# Patient Record
Sex: MALE | Race: WHITE | Employment: FULL TIME | URBAN - METROPOLITAN AREA
[De-identification: names, ages, dates, MRNs, and addresses within clinical notes are randomized per-mention and may not be internally consistent; named-entity substitution may affect disease eponyms.]

---
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
## 2022-03-17 DIAGNOSIS — G37.9 CLINICALLY ISOLATED SYNDROME (H): Primary | ICD-10-CM

## 2022-07-03 ENCOUNTER — TELEPHONE (OUTPATIENT)
Dept: NEUROLOGY | Facility: CLINIC | Age: 22
End: 2022-07-03

## 2022-07-03 NOTE — TELEPHONE ENCOUNTER
"Received a page from Sky with message that read \"MS symptoms.\" Attempted to call patient back at number provided and number in his chart multiple times. There was no response. I was able to leave a VM and instructed patient to call back and if he felt that he was in danger or required immediate medical attention to seek the nearest ER for evaluation.     Yuridia Marie DO   of Neurology   [facility]     "

## 2022-07-07 ENCOUNTER — ANCILLARY PROCEDURE (OUTPATIENT)
Dept: MRI IMAGING | Facility: CLINIC | Age: 22
End: 2022-07-07
Attending: PSYCHIATRY & NEUROLOGY
Payer: COMMERCIAL

## 2022-07-07 ENCOUNTER — TELEPHONE (OUTPATIENT)
Dept: NEUROLOGY | Facility: CLINIC | Age: 22
End: 2022-07-07

## 2022-07-07 DIAGNOSIS — G37.9 CLINICALLY ISOLATED SYNDROME (H): ICD-10-CM

## 2022-07-07 PROCEDURE — 70553 MRI BRAIN STEM W/O & W/DYE: CPT | Mod: GC | Performed by: RADIOLOGY

## 2022-07-07 PROCEDURE — A9585 GADOBUTROL INJECTION: HCPCS | Performed by: RADIOLOGY

## 2022-07-07 RX ORDER — GADOBUTROL 604.72 MG/ML
10 INJECTION INTRAVENOUS ONCE
Status: COMPLETED | OUTPATIENT
Start: 2022-07-07 | End: 2022-07-07

## 2022-07-07 RX ADMIN — GADOBUTROL 8 ML: 604.72 INJECTION INTRAVENOUS at 15:23

## 2022-07-07 NOTE — TELEPHONE ENCOUNTER
Patient called stating for the past few days he has had spreading numbness.  States initially it was in his heels only but now has risen to the hips and even up to the chest.  Denies weakness or any walking difficulty.      I instructed him to reach out to his neurologist tomorrow. Given his history I suspect he may need spinal MRI's but will defer decision on this and  steroids to his neuro-immunology provider.  I advised if symptoms are progressing and especially if develops weakness, falls, bladder dysfunction that he should present to ED more urgently for imaging.  Will route to his neurology team.      Sarah Rouse, DO

## 2022-07-13 ENCOUNTER — HOSPITAL ENCOUNTER (OUTPATIENT)
Dept: MRI IMAGING | Facility: CLINIC | Age: 22
Discharge: HOME | End: 2022-07-13
Attending: PSYCHIATRY & NEUROLOGY
Payer: COMMERCIAL

## 2022-07-13 DIAGNOSIS — G37.9 CLINICALLY ISOLATED SYNDROME (H): ICD-10-CM

## 2022-07-13 PROCEDURE — 72156 MRI NECK SPINE W/O & W/DYE: CPT

## 2022-07-13 PROCEDURE — A9585 GADOBUTROL INJECTION: HCPCS | Performed by: PSYCHIATRY & NEUROLOGY

## 2022-07-13 PROCEDURE — 255N000002 HC RX 255 OP 636: Performed by: PSYCHIATRY & NEUROLOGY

## 2022-07-13 PROCEDURE — 72157 MRI CHEST SPINE W/O & W/DYE: CPT

## 2022-07-13 RX ORDER — GADOBUTROL 604.72 MG/ML
8 INJECTION INTRAVENOUS ONCE
Status: COMPLETED | OUTPATIENT
Start: 2022-07-13 | End: 2022-07-13

## 2022-07-13 RX ADMIN — GADOBUTROL 8 ML: 604.72 INJECTION INTRAVENOUS at 17:22

## 2022-07-22 ENCOUNTER — TELEPHONE (OUTPATIENT)
Dept: NEUROLOGY | Facility: CLINIC | Age: 22
End: 2022-07-22

## 2022-07-22 DIAGNOSIS — G35 MS (MULTIPLE SCLEROSIS) (H): ICD-10-CM

## 2022-07-22 DIAGNOSIS — G35 MULTIPLE SCLEROSIS (H): Primary | ICD-10-CM

## 2022-07-22 RX ORDER — METHYLPREDNISOLONE 32 MG/1
TABLET ORAL
Qty: 155 TABLET | Refills: 0 | Status: SHIPPED | OUTPATIENT
Start: 2022-07-22

## 2022-07-22 NOTE — TELEPHONE ENCOUNTER
Prednisone 50 mg tablets were not covered by insurance. New prescription for methylprednisone 32 mg, 31 tablets daily for 5 days sent to pharmacy.    I instructed patient to call back if not covered by insurance.     Darshan Carlin MD   of Neurology  [facility]

## 2022-07-22 NOTE — TELEPHONE ENCOUNTER
Prior Authorization Specialty Medication Request    Medication/Dose: Aubagio 14 mg tablets  ICD code (if different than what is on RX):  Multiple Sclerosis  Previously Tried and Failed:      Important Lab Values:   Rationale: Initiation of disease modifying therapy for demyelinating disease, please approve    Insurance Name: [employer]  Insurance ID: [id number]  Insurance Phone Number: [phone]    Pharmacy Information (if different than what is on RX)  Name:    Phone:

## 2022-07-25 ENCOUNTER — LAB (OUTPATIENT)
Dept: LAB | Facility: CLINIC | Age: 22
End: 2022-07-25
Payer: COMMERCIAL

## 2022-07-25 DIAGNOSIS — G35 MS (MULTIPLE SCLEROSIS) (H): ICD-10-CM

## 2022-07-25 LAB
ALBUMIN SERPL-MCNC: 4.1 G/DL (ref 3.4–5)
ALP SERPL-CCNC: 88 U/L (ref 40–150)
ALT SERPL W P-5'-P-CCNC: 26 U/L (ref 0–70)
AST SERPL W P-5'-P-CCNC: 7 U/L (ref 0–45)
BASOPHILS # BLD AUTO: 0 10E3/UL (ref 0–0.2)
BASOPHILS NFR BLD AUTO: 0 %
BILIRUB DIRECT SERPL-MCNC: 0.2 MG/DL (ref 0–0.2)
BILIRUB SERPL-MCNC: 0.8 MG/DL (ref 0.2–1.3)
EOSINOPHIL # BLD AUTO: 0 10E3/UL (ref 0–0.7)
EOSINOPHIL NFR BLD AUTO: 0 %
ERYTHROCYTE [DISTWIDTH] IN BLOOD BY AUTOMATED COUNT: 11.9 % (ref 10–15)
HCT VFR BLD AUTO: 44 % (ref 40–53)
HGB BLD-MCNC: 15.8 G/DL (ref 13.3–17.7)
IMM GRANULOCYTES # BLD: 0 10E3/UL
IMM GRANULOCYTES NFR BLD: 0 %
LYMPHOCYTES # BLD AUTO: 0.3 10E3/UL (ref 0.8–5.3)
LYMPHOCYTES NFR BLD AUTO: 4 %
MCH RBC QN AUTO: 30 PG (ref 26.5–33)
MCHC RBC AUTO-ENTMCNC: 35.9 G/DL (ref 31.5–36.5)
MCV RBC AUTO: 84 FL (ref 78–100)
MONOCYTES # BLD AUTO: 0.2 10E3/UL (ref 0–1.3)
MONOCYTES NFR BLD AUTO: 2 %
NEUTROPHILS # BLD AUTO: 7.6 10E3/UL (ref 1.6–8.3)
NEUTROPHILS NFR BLD AUTO: 94 %
NRBC # BLD AUTO: 0 10E3/UL
NRBC BLD AUTO-RTO: 0 /100
PLATELET # BLD AUTO: 228 10E3/UL (ref 150–450)
PROT SERPL-MCNC: 6.7 G/DL (ref 6.8–8.8)
RBC # BLD AUTO: 5.26 10E6/UL (ref 4.4–5.9)
WBC # BLD AUTO: 8.1 10E3/UL (ref 4–11)

## 2022-07-25 PROCEDURE — 85025 COMPLETE CBC W/AUTO DIFF WBC: CPT | Performed by: PATHOLOGY

## 2022-07-25 PROCEDURE — 36415 COLL VENOUS BLD VENIPUNCTURE: CPT | Performed by: PATHOLOGY

## 2022-07-25 PROCEDURE — 99000 SPECIMEN HANDLING OFFICE-LAB: CPT | Performed by: PATHOLOGY

## 2022-07-25 PROCEDURE — 2894A QUANTIFERON TB GOLD PLUS: CPT | Mod: 90 | Performed by: PATHOLOGY

## 2022-07-25 PROCEDURE — 80076 HEPATIC FUNCTION PANEL: CPT | Performed by: PATHOLOGY

## 2022-07-25 NOTE — TELEPHONE ENCOUNTER
PA Initiation    Medication: Aubagio 14MG Tablets (PA PENDING)    Insurance Company: [employer] ([facility]) - Phone [phone] Fax [phone]  Pharmacy Filling the Rx: [facility] - [place], [place] - [address]  Filling Pharmacy Phone:    Filling Pharmacy Fax:    Start Date: 7/25/2022    PA submitted for Aubagio to [facility] PMAP plan. It appears the patient also has a [employer] commercial plan (PA not submitted - awaiting determination from [facility] PMAP)        Thank you,    Monica Moore h-T  Specialty Pharmacy Clinic Liaison - CardiologyNeurologyMultiple [facility] [address]  [address] [place] [address]  Ph: [phone] Fax: [phone]  [email or web address]

## 2022-07-26 ENCOUNTER — MEDICAL CORRESPONDENCE (OUTPATIENT)
Dept: HEALTH INFORMATION MANAGEMENT | Facility: CLINIC | Age: 22
End: 2022-07-26

## 2022-07-27 LAB
GAMMA INTERFERON BACKGROUND BLD IA-ACNC: 0.01 IU/ML
M TB IFN-G BLD-IMP: NEGATIVE
M TB IFN-G CD4+ BCKGRND COR BLD-ACNC: 4.87 IU/ML
MITOGEN IGNF BCKGRD COR BLD-ACNC: 0.01 IU/ML
MITOGEN IGNF BCKGRD COR BLD-ACNC: 0.01 IU/ML
QUANTIFERON MITOGEN: 4.88 IU/ML
QUANTIFERON NIL TUBE: 0.01 IU/ML
QUANTIFERON TB1 TUBE: 0.02 IU/ML
QUANTIFERON TB2 TUBE: 0.02

## 2022-08-08 NOTE — TELEPHONE ENCOUNTER
PRIOR AUTHORIZATION DENIED    Medication: Aubagio 14MG Tablets (PA DENIED)      Denial Date: 7/25/2022    Denial Rational: Must try/fail Copaxone    Appeal Information:

## 2022-08-09 ENCOUNTER — OFFICE VISIT (OUTPATIENT)
Dept: NEUROLOGY | Facility: CLINIC | Age: 22
End: 2022-08-09
Attending: PSYCHIATRY & NEUROLOGY
Payer: COMMERCIAL

## 2022-08-09 VITALS
HEART RATE: 75 BPM | SYSTOLIC BLOOD PRESSURE: 130 MMHG | DIASTOLIC BLOOD PRESSURE: 82 MMHG | WEIGHT: 175.7 LBS | BODY MASS INDEX: 25.21 KG/M2 | OXYGEN SATURATION: 97 %

## 2022-08-09 DIAGNOSIS — G35 MS (MULTIPLE SCLEROSIS) (H): Primary | ICD-10-CM

## 2022-08-09 PROCEDURE — G0463 HOSPITAL OUTPT CLINIC VISIT: HCPCS

## 2022-08-09 PROCEDURE — 99215 OFFICE O/P EST HI 40 MIN: CPT | Performed by: PSYCHIATRY & NEUROLOGY

## 2022-08-09 ASSESSMENT — PAIN SCALES - GENERAL: PAINLEVEL: NO PAIN (0)

## 2022-08-09 NOTE — PROGRESS NOTES
"Service Date: 08/09/2022    REASON FOR VISIT:  Sky Pierre is a 22-year-old man whom I follow for multiple sclerosis.  He returns for followup of a recent spinal relapse.    HISTORY OF PRESENT ILLNESS:  Sky called in early July with new numbness from the waist down.  I was on vacation at the time.  A colleague ordered MRIs of the brain and spinal cord, which showed a new enhancing lesion in the cervical cord at the C2-C3 level.  There was also a new nonenhancing brain lesion in the left parieto-occipital subcortical white matter.  I treated him with a course of steroids, and his symptoms have nearly completely resolved.  He still gets a little bit of Lhermitte symptom when leaning forward.  Motor function was not affected at all during this relapse nor was bladder function.  This was his second MS relapse after the initial one last summer with right leg and trunk numbness and tingling.  He now clearly meets diagnostic criteria for clinically definite multiple sclerosis.    PHYSICAL EXAMINATION:  Blood pressure 130/82, pulse 75, weight 175 pounds.  He is alert and oriented.  Affect is bright, and language functions are normal.  Cranial nerves are unremarkable.  Muscle bulk, tone, strength and dexterity are normal in the arms and legs.  Light touch and pinprick sensation are intact in all 4 limbs.  Deep tendon reflexes are normal and symmetric.  His gait is narrow based and stable with normal tandem walk and negative Romberg.    IMPRESSION:  Relapsing-remitting multiple sclerosis.    I spent 46 minutes on his care today, including chart review, face-to-face and documentation time.  We mainly discussed starting him on MS immunotherapy.  I went over the options, including injectable oral and IV preparations.  We had started the process of putting him on teriflunomide.  I went over the rationale for that.  He had received a denial from his insurer, but he says that was his \"secondary\" insurer, and he has yet to discuss " it with the primary insurer.  He is needle phobic, and injectable preparations are probably not an option.  We discussed dimethyl fumarate, Vumerity, Bafiertam and natalizumab as potential alternative options.  B-cell depletion would not be my first choice given his young age and the likelihood of hypogammaglobulinemia developing over long-term use with those medications.    PLAN:    1.  He will check with his insurers and let us know their decision.  2.  Follow up in 1 year with repeat brain MRI at that time.    Vince Mcdonald MD        D: 2022   T: 2022   MT: sharla    Name:     JALAVERNE BRADENFrancisco  MRN:      [id number]        Account:      [id number]   :      2000           Service Date: 2022       Document: [id number]

## 2022-08-10 NOTE — TELEPHONE ENCOUNTER
PA Initiation    Medication: Vumerity 231MG (PA DENIED)    Insurance Company: [employer] ([facility]) - Phone [phone] Fax [phone]  Pharmacy Filling the Rx: [facility] - [place], [place] - [address]  Filling Pharmacy Phone:    Filling Pharmacy Fax:    Start Date: 8/10/2022    PA was denied:    PRIOR AUTHORIZATION DENIED    Medication: Aubagio 14MG Tablets (PA DENIED)      Denial Date: 8/10/2022    Denial Rational:       Appeal Information:

## 2022-08-10 NOTE — TELEPHONE ENCOUNTER
PA Initiation    Medication: Dimethyl Fumarate Starter Pack 120 & 240MG    Insurance Company: [employer] ([facility]) - Phone [phone] Fax [phone]  Pharmacy Filling the Rx: [facility] - [place], [place] - [address]  Filling Pharmacy Phone:    Filling Pharmacy Fax:    Start Date: 8/10/2022        Thank you,    Monica Moore [facility]  Specialty Pharmacy Clinic Liaison - [facility] [address]  [address] [place] [address]  Ph: [phone] Fax: [phone]  [email or web address]

## 2022-08-12 NOTE — TELEPHONE ENCOUNTER
Daily Note     Today's date: 2020  Patient name: Nannette Cates  : 1961  MRN: [id number]  Referring provider: Funmi Garsia DPM  Dx:   Encounter Diagnosis     ICD-10-CM    1  Injury of left Achilles tendon, subsequent encounter [id number] 002D                   Subjective:  Patient reports the ankle is sore around the scar but overall feeling better with standing and walking activities  Patient reports minimal soreness in the left calf at the start of the session  Objective: See treatment diary below      Assessment: Tolerated treatment well  PT notes continuation of progression of TE program with focus on gait/balance and manual therapy to decrease pain levels and improve functional limitations to meet therapy goals  PT notes start of plyometric activities to  [address] as per MD protocol  PT notes the patient with difficulty with new TE secondary to weakness of the left gastroc with need for continuation of skilled therapy  Plan: Continue per plan of care        Precautions:  MD protocol (On Chart)       Manuals     Left ankle MRE all directions  3 min  3 min  3 min 3 min  3 min  3 min   Left ankle mobs and stretching  12 min w/ STM to calf 12 min  12 min 12 min  12 min  12 min                      Neuro Re-Ed         Monster walk          Side step and squat          Tandem and side step walk on foam  6x 10 ft ea NT 4x 10 ft ea 6x10 Feet   Each  6x10 Feet   Each  6x10 Feet Each    Rocker board          Step over  2x10 Bilat 6" Step DC 10x Bilat 6" Step 2x10 Bilat  6" step  2x10 Bilat  6" step  2x10 Bilat  6" step    BOSU march  3 min DC 2 min 3 min  3 min  3 min    S/L Dead lifts  15x Bilat 15x Bilat  10x Bilateral 10x Bilat  15x Bilat  15x Bilat    Cable column lateral walk outs  5x Bilat 24# 5x Bilat   36#   5x Bilat   24#  5x Bilat  24#  5x Bilat  36#    BOSU Front and lateral lunge  2x10 ea Bilat 2x10 Each   Bilat   10x   Bilat  Each  10x Bilat  Each  2x10 PRIOR AUTHORIZATION DENIED    Medication: Aubagio 14MG Tablets (PA DENIED)      Denial Date: 8/10/2022    Denial Rational: Try/fail Copaxone      Appeal Information:                Bilat   Each    BOSU step over with lateral kick out  5x Bilat 2x10 Bilat     5x Bilat    TB Run in Place   Thick Green   1 min                          BOSU Knee Ups   5x10" Hold   Left Only 2x       Ther Ex         SRC  10 min L6  10 min L6  10 min L3 10 min L4  10 min L5  10 min L6    Eccentric heel lowering  2x10 5" Decline   4" Step 2x10 5" Decline   4" step  2x10 5" decline 4" Step 2x10 5" Decline   4" step  2x10   5" Decline   4" step  2x10 5" Decline   4" Step    Step downs  2x10 Bilat 6" Step NT 10x Bilat 6" Step 2x10 Bilat  6" step  2x10 Bilat  6" step  2x10   Bilat   6" step    Leg and calf press  2x10 ea 60# 2x10 Each   60#   10x Each   50#  10x Each   50#  2x10 Each   60#    Bilateral ankle eversion with TB          BAPS board          Elliptical  6 min L1 8 min L1   5 min   L1  5 min L1  6 min   L1                                                 HEP update/review          Ther Activity                           Gait Training                           Modalities         CP to the left foot and ankle in seated with LE elevated  15 min  15 min  15 min 15 min  15 min  15 min

## 2022-08-12 NOTE — TELEPHONE ENCOUNTER
PA Initiation    Medication: Aubagio 14MG Tablets (PA PENDING)  Insurance Company: [employer] - Phone [phone] Fax [phone]  Pharmacy Filling the Rx: [facility] - [place], [place] - [address]  Filling Pharmacy Phone:    Filling Pharmacy Fax:    Start Date: 8/10/2022        Thank you,    Monica Moore [facility]  Specialty Pharmacy Clinic Liaison - [facility] [address]  [address] [place] [address]  Ph: [phone] Fax: [phone]  [email or web address]

## 2022-08-16 NOTE — TELEPHONE ENCOUNTER
Prior Authorization Approval    Authorization Effective Date: 8/15/2022  Authorization Expiration Date: 8/15/2025  Medication: Aubagio 14MG Tablets (PA APPROVED)  Approved Dose/Quantity: 30 days  Reference #:     Insurance Company: [employer] - Phone [phone] Fax [phone]  Expected CoPay:       CoPay Card Available: No    Foundation Assistance Needed:    Which Pharmacy is filling the prescription (Not needed for infusion/clinic administered): [facility] - [place], [place] - [address] [place] [address] COURT  Pharmacy Notified:    Patient Notified:        Confirmed that the PA was approved for Aubagio. [facility]. Faxed Start form to the hub. The PA approval letter is being mailed to the clinic. Also, sent start for to scanning for the media tab.     Thank you,    Monica Moore [facility]  Specialty Pharmacy Clinic Liaison - CardiologyNeurologyMultiple Sclerosis  [facility] [address]  [address] [place] [address]  Ph: [phone] Fax: [phone]  [email or web address]

## 2022-08-18 RX ORDER — TERIFLUNOMIDE 14 MG/1
14 TABLET, FILM COATED ORAL DAILY
Qty: 30 TABLET | Refills: 11 | Status: SHIPPED | OUTPATIENT
Start: 2022-08-18 | End: 2023-08-21

## 2022-08-18 NOTE — TELEPHONE ENCOUNTER
Aubagio prescription entered on behalf of Dr Mcdonald, to be sent to [facility].     Maye Palma RN

## 2022-10-01 ENCOUNTER — HEALTH MAINTENANCE LETTER (OUTPATIENT)
Age: 22
End: 2022-10-01

## 2023-03-28 DIAGNOSIS — G35 MULTIPLE SCLEROSIS (H): ICD-10-CM

## 2023-03-28 RX ORDER — TERIFLUNOMIDE 14 MG/1
14 TABLET, FILM COATED ORAL DAILY
Qty: 30 TABLET | Refills: 11 | OUTPATIENT
Start: 2023-03-28

## 2023-03-28 NOTE — TELEPHONE ENCOUNTER
Rx was sent with refills for a year in August 2022. Called [facility] and they confirmed that they have 5 refills on file. Refill request declined.    Maye Palma RN

## 2023-08-09 ENCOUNTER — ANCILLARY PROCEDURE (OUTPATIENT)
Dept: MRI IMAGING | Facility: CLINIC | Age: 23
End: 2023-08-09
Attending: PSYCHIATRY & NEUROLOGY
Payer: COMMERCIAL

## 2023-08-09 DIAGNOSIS — G35 MS (MULTIPLE SCLEROSIS) (H): ICD-10-CM

## 2023-08-09 PROCEDURE — A9585 GADOBUTROL INJECTION: HCPCS | Performed by: RADIOLOGY

## 2023-08-09 PROCEDURE — 70553 MRI BRAIN STEM W/O & W/DYE: CPT | Mod: GC | Performed by: RADIOLOGY

## 2023-08-09 RX ORDER — GADOBUTROL 604.72 MG/ML
10 INJECTION INTRAVENOUS ONCE
Status: COMPLETED | OUTPATIENT
Start: 2023-08-09 | End: 2023-08-09

## 2023-08-09 RX ADMIN — GADOBUTROL 8 ML: 604.72 INJECTION INTRAVENOUS at 14:45

## 2023-08-21 DIAGNOSIS — G35 MULTIPLE SCLEROSIS (H): ICD-10-CM

## 2023-08-22 RX ORDER — TERIFLUNOMIDE 14 MG/1
14 TABLET, FILM COATED ORAL DAILY
Qty: 30 TABLET | Refills: 11 | Status: SHIPPED | OUTPATIENT
Start: 2023-08-22 | End: 2024-08-08

## 2023-08-22 NOTE — TELEPHONE ENCOUNTER
Received refill request for teriflunomide from [facility]; Patient was last seen in Aug 2022 and has follow up appointment in Aug 2023 with Dr Mcdonald. Refilled per MS refill protocol.    Maye Palma RN

## 2023-08-29 ENCOUNTER — VIRTUAL VISIT (OUTPATIENT)
Dept: NEUROLOGY | Facility: CLINIC | Age: 23
End: 2023-08-29
Attending: PSYCHIATRY & NEUROLOGY
Payer: COMMERCIAL

## 2023-08-29 DIAGNOSIS — G35 MS (MULTIPLE SCLEROSIS) (H): Primary | ICD-10-CM

## 2023-08-29 PROCEDURE — 99214 OFFICE O/P EST MOD 30 MIN: CPT | Mod: VID | Performed by: PSYCHIATRY & NEUROLOGY

## 2023-08-29 ASSESSMENT — PAIN SCALES - GENERAL: PAINLEVEL: NO PAIN (0)

## 2023-08-29 NOTE — PROGRESS NOTES
"Virtual Visit Details    Type of service:  Video Visit   Video Start Time: 1506  Video End Time:1521    Originating Location (pt. Location): Other friend's house    Distant Location (provider location):  On-site  Platform used for Video Visit: Velotton     ID:  Sky Pierre is a 23-year-old man who I follow for multiple sclerosis.  This was a video visit for clinical follow-up and MRI review.  I last saw him a year ago.    HPI:  Sky had a spinal relapse in July 2022, confirming the diagnosis of relapsing multiple sclerosis.  He was treated with steroids and has recovered completely.  He started on teriflunomide last August, and is tolerating that well.  He has not had any liver test monitoring since starting it however.  We discussed safety monitoring with that medication I told him we definitely need to check those at this time.  He is in town for few more days before going back to college in [employer] [place] at the end of this week.  I tried to convince him to get the blood test for he goes back but he preferred that we send the orders to his student health center at [employer] [facility] [employer].  Currently has no residual symptoms from MS.    We reviewed together his brain MRI from earlier this month.  Compared to the prior exam from July 2022 there is a new lesion in the right splenium of the corpus callosum and another lesion in the left frontal periventricular white matter.  There were no enhancing lesions.  Because the previous MRI was done about 6 weeks before he started the teriflunomide, these new lesions cannot be considered as evidence of teriflunomide \"failure\".      Current Outpatient Medications:      acetaminophen (TYLENOL) 500 MG tablet, Take 500 mg by mouth every 4 hours as needed, Disp: , Rfl:      ibuprofen (ADVIL/MOTRIN) 200 MG tablet, Take 600 mg by mouth every 6 hours as needed, Disp: , Rfl:      methylPREDNISolone (MEDROL) 32 MG tablet, Take 31 tablets daily (992 mg) for 5 days, Disp: 155 tablet, Rfl: 0     teriflunomide " (AUBAGIO) 14 MG tablet, Take 1 tablet (14 mg) by mouth daily Only available through select specialty pharmacies, Disp: 30 tablet, Rfl: 11     Exam: He is alert and oriented.  Affect is bright and language functions are normal.  There is no dysarthria or dysphonia.    Impression: Relapsing remitting multiple sclerosis, clinically stable on teriflunomide.  I spent 34 minutes on his care on the day of service including chart and MRI review and video time.  We discussed laboratory monitoring with teriflunomide and implications of his new MRI lesions.    Plan: Hepatic panel and CBC which we will send to [facility].  Vitamin D 5000 units/day.  Follow-up in 1 year with brain MRI.

## 2023-08-29 NOTE — NURSING NOTE
Is the patient currently in the state of [place]? YES    Visit mode:VIDEO    If the visit is dropped, the patient can be reconnected by: VIDEO VISIT: Send to e-mail at: [email or web address]    Will anyone else be joining the visit? NO  (If patient encounters technical issues they should call [phone])    How would you like to obtain your AVS? MyChart    Are changes needed to the allergy or medication list? No    Reason for visit: RECHECK    Candida GARRETT

## 2023-08-29 NOTE — Clinical Note
8/29/2023       RE: Sky Pierre  [address]  [place] [address]     Dear Colleague,    Thank you for referring your patient, Sky Pierre, to the [facility] [place] [facility]. Please see a copy of my visit note below.    Virtual Visit Details    Type of service:  Video Visit   Video Start Time: 1506  Video End Time:1521    Originating Location (pt. Location): Other friend's house  {PROVIDER LOCATION On-site should be selected for visits conducted from your clinic location or adjoining [facility] hospital, academic office, or other nearby [facility] building. Off-site should be selected for all other provider locations, including home:[id number]}  Distant Location (provider location):  On-site  Platform used for Video Visit: [employer]M Power     ID:  Sky Pierre is a 23-year-old man who I follow for multiple sclerosis.  This was a video visit for clinical follow-up and MRI review.  I last saw him a year ago.    HPI:  Sky had a spinal relapse in July 2022, confirming the diagnosis of relapsing multiple sclerosis.  He was treated with steroids and has recovered completely.  He started on teriflunomide last August, and is tolerating that well.  He has not had any lipid monitoring since starting it however.  We discussed safety monitoring without medication I told him we definitely need to check those at this time.  He is in town for few more days before going back to college in [place] [facility] at the end of this week.  I tried to convince him to get the blood test for he goes back but he preferred that we send the orders to his student health center at [employer].  Currently has no residual symptoms from MS.    We reviewed together his brain MRI from earlier this month.  Compared to the prior exam from July 2022 there is a new lesion in the right splenium of the corpus callosum and another lesion in the left frontal periventricular white matter.  There were no  "enhancing lesions.  Because the previous MRI was done about 6 weeks before he started the teriflunomide, these new lesions cannot be considered as evidence of teriflunomide \"failure\".      Current Outpatient Medications:      acetaminophen (TYLENOL) 500 MG tablet, Take 500 mg by mouth every 4 hours as needed, Disp: , Rfl:      ibuprofen (ADVIL/MOTRIN) 200 MG tablet, Take 600 mg by mouth every 6 hours as needed, Disp: , Rfl:      methylPREDNISolone (MEDROL) 32 MG tablet, Take 31 tablets daily (992 mg) for 5 days, Disp: 155 tablet, Rfl: 0     teriflunomide (AUBAGIO) 14 MG tablet, Take 1 tablet (14 mg) by mouth daily Only available through select specialty pharmacies, Disp: 30 tablet, Rfl: 11     Exam: He is alert and oriented.  Affect is bright and language functions are normal.  There is no dysarthria or dysphonia.    Impression: Relapsing remitting multiple sclerosis, clinically stable on teriflunomide.  I spent 34 minutes on his care on the day of service including chart and MRI review and video time.  We discussed laboratory monitoring with teriflunomide and implications of his new MRI lesions.    Plan: Hepatic panel and CBC which we will send to [facility].  Vitamin D 5000 units/day.  Follow-up in 1 year with brain MRI.      Again, thank you for allowing me to participate in the care of your patient.      Sincerely,    Vince Mcdonald MD    "

## 2023-09-28 ENCOUNTER — DOCUMENTATION ONLY (OUTPATIENT)
Dept: NEUROLOGY | Facility: CLINIC | Age: 23
End: 2023-09-28
Payer: COMMERCIAL

## 2023-09-28 NOTE — PROGRESS NOTES
Lab results received from [facility], results placed in Dr. Mcdonald's folder for review and signature.   Octavio Hanna EMT 09/28/2023 8:54AM

## 2023-10-15 ENCOUNTER — HEALTH MAINTENANCE LETTER (OUTPATIENT)
Age: 23
End: 2023-10-15

## 2023-11-14 NOTE — PROGRESS NOTES
Lab results received from [facility], results placed in Dr. Mcdonald's folder for review.  Octavio Hanna EMT 11/14/20238:26AM

## 2024-01-15 ENCOUNTER — TELEPHONE (OUTPATIENT)
Dept: NEUROLOGY | Facility: CLINIC | Age: 24
End: 2024-01-15
Payer: COMMERCIAL

## 2024-01-15 NOTE — TELEPHONE ENCOUNTER
Left Voicemail (1st Attempt) and Sent Mychart (1st Attempt) for the patient to call back and schedule the following:    Appointment type: Return MS  Provider: Tamara  Return date: On or near 8/15/24  Specialty phone number: [phone]  Additional appointment(s) needed: MRI, prior to visit  Additonal Notes: ROBERT Fung on 1/15/2024 at 11:04 AM

## 2024-01-17 NOTE — TELEPHONE ENCOUNTER
Patient Contacted to schedule the following:    Appointment type: Return MS  Provider: Tamara  Return date: On or near 8/15/24  Specialty phone number: [phone]  Additional appointment(s) needed: MRI, prior to visit  Additonal Notes: NA    Spoke with patient, conference'd in imaging scheduler [place]. Scheduled MRI on 8/15, follow-up on 8/20.    Bhaskar Awan on 1/17/2024 at 5:47 PM

## 2024-08-08 DIAGNOSIS — G35 MULTIPLE SCLEROSIS (H): ICD-10-CM

## 2024-08-08 RX ORDER — TERIFLUNOMIDE 14 MG/1
14 TABLET, FILM COATED ORAL DAILY
Qty: 30 TABLET | Refills: 11 | Status: SHIPPED | OUTPATIENT
Start: 2024-08-08

## 2024-08-08 NOTE — TELEPHONE ENCOUNTER
Received refill request for teriflunomide from [facility]; Patient was last seen in Aug 2023 and has follow up appointment in Aug 2024 with Dr Mcdonald. Refilled per MS refill protocol.    Maye Palma RN

## 2024-11-12 ENCOUNTER — TRANSFERRED RECORDS (OUTPATIENT)
Dept: HEALTH INFORMATION MANAGEMENT | Facility: CLINIC | Age: 24
End: 2024-11-12

## 2024-11-12 LAB
ALT SERPL-CCNC: 18 U/L (ref 0–41)
AST SERPL-CCNC: 15 U/L (ref 0–40)

## 2024-12-07 ENCOUNTER — HEALTH MAINTENANCE LETTER (OUTPATIENT)
Age: 24
End: 2024-12-07

## 2025-03-14 ENCOUNTER — VIRTUAL VISIT (OUTPATIENT)
Dept: FAMILY MEDICINE | Facility: CLINIC | Age: 25
End: 2025-03-14
Payer: COMMERCIAL

## 2025-03-14 DIAGNOSIS — F33.1 MODERATE EPISODE OF RECURRENT MAJOR DEPRESSIVE DISORDER (H): ICD-10-CM

## 2025-03-14 DIAGNOSIS — G35 MS (MULTIPLE SCLEROSIS) (H): ICD-10-CM

## 2025-03-14 DIAGNOSIS — Z00.00 ENCOUNTER FOR MEDICAL EXAMINATION TO ESTABLISH CARE: Primary | ICD-10-CM

## 2025-03-14 PROCEDURE — 96127 BRIEF EMOTIONAL/BEHAV ASSMT: CPT | Mod: 95

## 2025-03-14 PROCEDURE — 98001 SYNCH AUDIO-VIDEO NEW LOW 30: CPT

## 2025-03-14 ASSESSMENT — COLUMBIA-SUICIDE SEVERITY RATING SCALE - C-SSRS
2. IN THE PAST MONTH, HAVE YOU ACTUALLY HAD ANY THOUGHTS OF KILLING YOURSELF?: NO
6. HAVE YOU EVER DONE ANYTHING, STARTED TO DO ANYTHING, OR PREPARED TO DO ANYTHING TO END YOUR LIFE?: NO
1. WITHIN THE PAST MONTH, HAVE YOU WISHED YOU WERE DEAD OR WISHED YOU COULD GO TO SLEEP AND NOT WAKE UP?: YES

## 2025-03-14 ASSESSMENT — PATIENT HEALTH QUESTIONNAIRE - PHQ9
SUM OF ALL RESPONSES TO PHQ QUESTIONS 1-9: 20
10. IF YOU CHECKED OFF ANY PROBLEMS, HOW DIFFICULT HAVE THESE PROBLEMS MADE IT FOR YOU TO DO YOUR WORK, TAKE CARE OF THINGS AT HOME, OR GET ALONG WITH OTHER PEOPLE: VERY DIFFICULT
SUM OF ALL RESPONSES TO PHQ QUESTIONS 1-9: 20

## 2025-03-14 NOTE — PROGRESS NOTES
Sky is a 25 year old who is being evaluated via a billable video visit.    How would you like to obtain your AVS? MyChart  If the video visit is dropped, the invitation should be resent by: Send to e-mail at: [email or web address] other things [email or web address]  Will anyone else be joining your video visit? No      Assessment & Plan     Encounter for medical examination to establish care  Reviewed history, health maintenance and medications.    MS (multiple sclerosis) (H)  Dx in 2021. Reestablishing with neurologist in [place] after moving back from [place]. Would like to having MRI's completed prior to his appointment with them in April. I recommend he call/send a message to them regarding what imaging they would want completed prior to this appointment and he will let me know.     Moderate episode of recurrent major depressive disorder (H)  PHQ9 score: 20. Some situational factors exacerbating depression. Dose not currently have a therapist, was working with one when he was in college. Will put in referral for this. Denies active suicidal ideations or intent. Some passive thoughts. Not interested in medications at this time. Discussed crisis numbers/resources. He verbalizes understanding .   - Adult Mental Health  Referral      Plan to follow up soon for routine annual physical.     The longitudinal plan of care for the diagnosis(es)/condition(s) as documented were addressed during this visit. Due to the added complexity in care, I will continue to support Sky in the subsequent management and with ongoing continuity of care.      Depression Screening Follow Up        3/14/2025     3:04 PM   PHQ   PHQ-9 Total Score 20    Q9: Thoughts of better off dead/self-harm past 2 weeks More than half the days    F/U: Thoughts of suicide or self-harm Yes    F/U: Self harm-plan No    F/U: Self-harm action No    F/U: Safety concerns No        Proxy-reported             3/14/2025     5:34 PM   C-SSRS (Brief [place])   Within the last month,  have you wished you were dead or wished you could go to sleep and not wake up? Yes   Within the last month, have you had any actual thoughts of killing yourself? No   Within the last month, have you ever done anything, started to do anything, or prepared to do anything to end your life? No         Follow Up Actions Taken  Crisis resource information provided in the After Visit Summary  Mental Health Referral placed    Discussed the following ways the patient can remain in a safe environment:  remove things I could use to hurt myself:   and be around others        Monika Swanson is a 25 year old, presenting for the following health issues:  Office Visit (Establish care/ orders for MRI. )      3/14/2025     3:05 PM   Additional Questions   Roomed by Consuelo   Accompanied by Alone         3/14/2025     3:05 PM   Patient Reported Additional Medications   Patient reports taking the following new medications None.     History of Present Illness       Reason for visit:  Establish care/ orders for MRI    He eats 2-3 servings of fruits and vegetables daily.He consumes 0 sweetened beverage(s) daily.He exercises with enough effort to increase his heart rate 9 or less minutes per day.  He exercises with enough effort to increase his heart rate 3 or less days per week.   He is taking medications regularly.        Establishing care.    Been about 5 years since had a PCP.    Seeing neurologist for MS. Wanting to get MRIs completed before his appointment with them.   Dx 2021.     Tore ACL - s/p surgery.    Moved to [place] for a job for a few months - then moved back home.    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 Having a hard time finding a job and what exactly he wants to do long term which has exacerbated his depression.   History of depression - started around college  Overeats.  Oversleeps.  In the last 2 months some worsening passive thoughts SI, no plan or intent  Was in therapy in college.  Harder now  without college structure and community of friends.         Review of Systems  Constitutional, HEENT, cardiovascular, pulmonary, gi and gu systems are negative, except as otherwise noted.      Objective    Vitals - Patient Reported  Weight (Patient Reported): 91.4 kg (201 lb 8 oz)      Physical Exam   GENERAL: alert and no distress  EYES: Eyes grossly normal to inspection.  No discharge or erythema, or obvious scleral/conjunctival abnormalities.  RESP: No audible wheeze, cough, or visible cyanosis.    SKIN: Visible skin clear. No significant rash, abnormal pigmentation or lesions.  NEURO: Cranial nerves grossly intact.  Mentation and speech appropriate for age.  PSYCH: Appropriate affect, tone, and pace of words          Video-Visit Details    Type of service:  Video Visit   Originating Location (pt. Location): Home  Distant Location (provider location):  On-site  Platform used for Video Visit: Elizabeth    Signed Electronically by: MARÍA Maxwell CNP

## 2025-03-31 DIAGNOSIS — G35 MULTIPLE SCLEROSIS (H): ICD-10-CM

## 2025-04-01 RX ORDER — TERIFLUNOMIDE 14 MG/1
14 TABLET, FILM COATED ORAL DAILY
Qty: 30 TABLET | Refills: 11 | Status: SHIPPED | OUTPATIENT
Start: 2025-04-01

## 2025-04-14 ENCOUNTER — MYC MEDICAL ADVICE (OUTPATIENT)
Dept: NEUROLOGY | Facility: CLINIC | Age: 25
End: 2025-04-14
Payer: COMMERCIAL

## 2025-04-14 DIAGNOSIS — G35 MULTIPLE SCLEROSIS (H): ICD-10-CM

## 2025-04-15 ENCOUNTER — HOSPITAL ENCOUNTER (OUTPATIENT)
Dept: MRI IMAGING | Facility: HOSPITAL | Age: 25
Discharge: HOME | End: 2025-04-15
Attending: PSYCHIATRY & NEUROLOGY | Admitting: PSYCHIATRY & NEUROLOGY
Payer: COMMERCIAL

## 2025-04-15 DIAGNOSIS — G35 MULTIPLE SCLEROSIS (H): ICD-10-CM

## 2025-04-15 PROCEDURE — 70553 MRI BRAIN STEM W/O & W/DYE: CPT

## 2025-04-15 PROCEDURE — A9585 GADOBUTROL INJECTION: HCPCS | Performed by: PSYCHIATRY & NEUROLOGY

## 2025-04-15 PROCEDURE — 255N000002 HC RX 255 OP 636: Performed by: PSYCHIATRY & NEUROLOGY

## 2025-04-15 RX ORDER — GADOBUTROL 604.72 MG/ML
0.1 INJECTION INTRAVENOUS ONCE
Status: COMPLETED | OUTPATIENT
Start: 2025-04-15 | End: 2025-04-15

## 2025-04-15 RX ADMIN — GADOBUTROL 7.94 ML: 604.72 INJECTION INTRAVENOUS at 16:30

## 2025-04-15 NOTE — TELEPHONE ENCOUNTER
Pt requesting refill of teriflunomide. Last visit Aug 2023, with follow-up scheduled for April 2025. Unable to refill per MS refill protocol due to length of time since last appt. Pended to Dr Mcdonald for approval.    Maye Palma RN

## 2025-04-17 RX ORDER — TERIFLUNOMIDE 14 MG/1
14 TABLET, FILM COATED ORAL DAILY
Qty: 30 TABLET | Refills: 11 | OUTPATIENT
Start: 2025-04-17

## 2025-04-22 ENCOUNTER — OFFICE VISIT (OUTPATIENT)
Dept: NEUROLOGY | Facility: CLINIC | Age: 25
End: 2025-04-22
Attending: PSYCHIATRY & NEUROLOGY
Payer: COMMERCIAL

## 2025-04-22 VITALS
OXYGEN SATURATION: 99 % | DIASTOLIC BLOOD PRESSURE: 77 MMHG | HEIGHT: 71 IN | HEART RATE: 66 BPM | SYSTOLIC BLOOD PRESSURE: 116 MMHG | WEIGHT: 204.3 LBS | BODY MASS INDEX: 28.6 KG/M2

## 2025-04-22 DIAGNOSIS — G35 MS (MULTIPLE SCLEROSIS) (H): Primary | ICD-10-CM

## 2025-04-22 PROCEDURE — 99214 OFFICE O/P EST MOD 30 MIN: CPT | Performed by: PSYCHIATRY & NEUROLOGY

## 2025-04-22 ASSESSMENT — PAIN SCALES - GENERAL: PAINLEVEL_OUTOF10: NO PAIN (0)

## 2025-04-22 NOTE — Clinical Note
4/22/2025       RE: Sky Pierre  [address] [place] [address]     Dear Colleague,    Thank you for referring your patient, Sky Pierre, to the [facility] [place] at [facility]. Please see a copy of my visit note below.    ID: Sky Pierre is a 25-year-old man who I follow for multiple sclerosis.  He returns for clinical follow-up and MRI review.  I last saw him in August 2023.    HPI: Sky has been stable from an MS perspective, with no new symptoms suggestive of interim relapse or disease progression.  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  He was started on teriflunomide at that point and has not taken it ever since.  He has no residual symptoms from MS at this point.  Sensory, motor, bladder, vision and cognition are all doing fine.  He has no side effects from the teriflunomide.  He did have a period of about 2 weeks where he had to go without it regarding a change in job, relocation to [place] change in insurance and change in pharmacy.    No past medical history on file.   MS      Current Outpatient Medications:     teriflunomide (AUBAGIO) 14 MG tablet, Take 1 tablet (14 mg) by mouth daily. Only available through select specialty pharmacies, Disp: 30 tablet, Rfl: 11  Exam: He is alert and oriented.  Affect is bright and language functions are normal.  Cranial nerves are unremarkable.  Muscle bulk, tone, strength and dexterity are normal in the arms and legs.  Light touch is intact in all 4 limbs.  Finger tapping, toe tapping and finger-nose-finger are normal.  Reflexes are normal and symmetric.  Gait is narrow-based and stable with normal tandem walk and negative Romberg.    We reviewed together his MRI done last week.  It shows 2 new nonenhancing  lesions, 1 in the left parietal intermediate white matter and a very small lesion in the left lynnette.    Impression: Relapsing onset MS, clinically stable but with 2 new asymptomatic MRI lesions.  I spent 37 minutes on his care today including chart review, face-to-face and documentation time.  We discussed the implication of these new MRI lesions.  I told him it means that the teriflunomide was not perfectly controlling his MS, and that is the goal.  The several week involuntary discontinuation may bear some responsibility for the new lesions.  We discussed options including treatment change versus ongoing MRI surveillance.  I told him if we did the latter then any new lesion accumulation would be grounds for changing treatment.  That was the route he ultimately preferred.  The longitudinal plan of care for the diagnosis(es)/condition(s) as documented were addressed during this visit. Due to the added complexity in care, I will continue to support Sky in the subsequent management and with ongoing continuity of care.  Plan: Follow-up in 6 months with MRI brain.    This note was completed in part using voice-recognition software, and some typographic errors may be present as a result.       Again, thank you for allowing me to participate in the care of your patient.      Sincerely,    Vince Mcdonald MD

## 2025-04-22 NOTE — PROGRESS NOTES
ID: Sky Pierre is a 25-year-old man who I follow for multiple sclerosis.  He returns for clinical follow-up and MRI review.  I last saw him in August 2023.    HPI: Sky has been stable from an MS perspective, with no new symptoms suggestive of interim relapse or disease progression.  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  He was started on teriflunomide at that point and has not taken it ever since.  He has no residual symptoms from MS at this point.  Sensory, motor, bladder, vision and cognition are all doing fine.  He has no side effects from the teriflunomide.  He did have a period of about 2 weeks where he had to go without it regarding a change in job, relocation to [place] change in insurance and change in pharmacy.    No past medical history on file.   MS      Current Outpatient Medications:     teriflunomide (AUBAGIO) 14 MG tablet, Take 1 tablet (14 mg) by mouth daily. Only available through select specialty pharmacies, Disp: 30 tablet, Rfl: 11  Exam: He is alert and oriented.  Affect is bright and language functions are normal.  Cranial nerves are unremarkable.  Muscle bulk, tone, strength and dexterity are normal in the arms and legs.  Light touch is intact in all 4 limbs.  Finger tapping, toe tapping and finger-nose-finger are normal.  Reflexes are normal and symmetric.  Gait is narrow-based and stable with normal tandem walk and negative Romberg.    We reviewed together his MRI done last week.  It shows 2 new nonenhancing lesions, 1 in the left parietal intermediate white matter and a very small lesion in the left lynnette.    Impression: Relapsing onset MS, clinically stable but with 2 new asymptomatic MRI lesions.  I spent 37 minutes on his care today including chart review, face-to-face and documentation time.  We discussed the implication  of these new MRI lesions.  I told him it means that the teriflunomide was not perfectly controlling his MS, and that is the goal.  The several week involuntary discontinuation may bear some responsibility for the new lesions.  We discussed options including treatment change versus ongoing MRI surveillance.  I told him if we did the latter then any new lesion accumulation would be grounds for changing treatment.  That was the route he ultimately preferred.  The longitudinal plan of care for the diagnosis(es)/condition(s) as documented were addressed during this visit. Due to the added complexity in care, I will continue to support Sky in the subsequent management and with ongoing continuity of care.  Plan: Follow-up in 6 months with MRI brain.    This note was completed in part using voice-recognition software, and some typographic errors may be present as a result.

## 2025-04-22 NOTE — NURSING NOTE
"Chief Complaint   Patient presents with    MS    RECHECK     MS follow up      Vitals were taken and medications were reconciled. EKG was performed   ANNETTA Leger  9:33 AM    Ht 1.798 m (5' 10.79\")   Wt 92.7 kg (204 lb 4.8 oz)   BMI 28.67 kg/m      "

## 2025-05-08 ENCOUNTER — TELEPHONE (OUTPATIENT)
Dept: NEUROLOGY | Facility: CLINIC | Age: 25
End: 2025-05-08
Payer: COMMERCIAL

## 2025-05-08 NOTE — TELEPHONE ENCOUNTER
Patient confirmed scheduled appointment:  Date: 10/21/25  Time: 330pm  Visit type: Return MS  Provider: Tamara  Location: [facility]  Testing/imaging: MRI prior to appt  Additional notes: 6 month follow up    Jazmín Smith on 5/8/2025 at 4:48 PM

## 2025-06-09 ENCOUNTER — OFFICE VISIT (OUTPATIENT)
Dept: FAMILY MEDICINE | Facility: CLINIC | Age: 25
End: 2025-06-09
Payer: COMMERCIAL

## 2025-06-09 VITALS
HEART RATE: 66 BPM | BODY MASS INDEX: 28.93 KG/M2 | RESPIRATION RATE: 11 BRPM | WEIGHT: 202.1 LBS | HEIGHT: 70 IN | TEMPERATURE: 97.8 F | OXYGEN SATURATION: 97 %

## 2025-06-09 DIAGNOSIS — T14.8XXA PUNCTURE WOUND: Primary | ICD-10-CM

## 2025-06-09 DIAGNOSIS — F32.A DEPRESSION, UNSPECIFIED DEPRESSION TYPE: ICD-10-CM

## 2025-06-09 PROCEDURE — 90715 TDAP VACCINE 7 YRS/> IM: CPT

## 2025-06-09 PROCEDURE — G2211 COMPLEX E/M VISIT ADD ON: HCPCS

## 2025-06-09 PROCEDURE — 1125F AMNT PAIN NOTED PAIN PRSNT: CPT

## 2025-06-09 PROCEDURE — 96127 BRIEF EMOTIONAL/BEHAV ASSMT: CPT

## 2025-06-09 PROCEDURE — 90471 IMMUNIZATION ADMIN: CPT

## 2025-06-09 PROCEDURE — 99213 OFFICE O/P EST LOW 20 MIN: CPT | Mod: 25

## 2025-06-09 RX ORDER — CEPHALEXIN 500 MG/1
500 CAPSULE ORAL 2 TIMES DAILY
Qty: 14 CAPSULE | Refills: 0 | Status: SHIPPED | OUTPATIENT
Start: 2025-06-09 | End: 2025-06-16

## 2025-06-09 ASSESSMENT — COLUMBIA-SUICIDE SEVERITY RATING SCALE - C-SSRS
6. HAVE YOU EVER DONE ANYTHING, STARTED TO DO ANYTHING, OR PREPARED TO DO ANYTHING TO END YOUR LIFE?: NO
2. IN THE PAST MONTH, HAVE YOU ACTUALLY HAD ANY THOUGHTS OF KILLING YOURSELF?: NO
1. WITHIN THE PAST MONTH, HAVE YOU WISHED YOU WERE DEAD OR WISHED YOU COULD GO TO SLEEP AND NOT WAKE UP?: YES

## 2025-06-09 ASSESSMENT — PATIENT HEALTH QUESTIONNAIRE - PHQ9
SUM OF ALL RESPONSES TO PHQ QUESTIONS 1-9: 17
10. IF YOU CHECKED OFF ANY PROBLEMS, HOW DIFFICULT HAVE THESE PROBLEMS MADE IT FOR YOU TO DO YOUR WORK, TAKE CARE OF THINGS AT HOME, OR GET ALONG WITH OTHER PEOPLE: VERY DIFFICULT
SUM OF ALL RESPONSES TO PHQ QUESTIONS 1-9: 17

## 2025-06-09 ASSESSMENT — PAIN SCALES - GENERAL: PAINLEVEL_OUTOF10: MILD PAIN (2)

## 2025-06-09 NOTE — PROGRESS NOTES
Assessment & Plan     (T14.8XXA) Puncture wound  (primary encounter diagnosis)  Comment: Acute and stable.  No findings that would warrant the need for immediate medical attention.  Patient does believe that he was historically up-to-date on his childhood vaccinations, but is unsure about the date of his last booster.  With most recent puncture wound with dirty nail, it is important to update tetanus booster in the clinic today, and will add on Keflex for prophylactic infection management.  Walked him through wound care management, other supportive therapy options such as Tylenol and ibuprofen for comfort, and symptoms that would warrant the need for further follow-up. Offered education on medications including appropriate dosing, possible side effects, and possible adverse effects.  Education given on return to clinic instructions as well as alarm signs that would require the need for immediate medical attention.  Patient attested to understanding.  Plan: TDAP 10-64Y (ADACEL,BOOSTRIX), cephALEXin         (KEFLEX) 500 MG capsule    (F32.A) Depression, unspecified depression type  Comment: Chronic.  No concerns for acute mental health crisis, concerns for suicide, or findings that would warrant the need for immediate medical attention.  Ongoing depressive concerns with indication for thoughts of self-harm without intention or plan for self-harm.  Patient declines feeling unsafe.  Not currently on any medication for management, and does not utilize mental health referral for management either.  Working on some at home habit adjustment and meditation along with self-medication with marijuana.  Patient declines desire for further referral or medication management at this time.  Crisis resources attached after visit summary, and reiterated to patient that we are always available at the clinic if he changes his mind about desire for referral or medication management.  Patient verbalized understanding and  agreement.  Plan: Continue with at home lifestyle adjustments and follow-up with need for additional resources       This progress note has been dictated, with use of voice recognition software. Any grammatical, typographical, or context errors are unintentional and inherent to use of voice recognition software.     Prescription drug management  I spent a total of 21 minutes on the day of the visit.   Time spent by me today doing chart review, history and exam, documentation and further activities per the note      Depression Screening Follow Up        6/9/2025    11:09 AM   PHQ   PHQ-9 Total Score 17    Q9: Thoughts of better off dead/self-harm past 2 weeks Several days   F/U: Thoughts of suicide or self-harm No   F/U: Safety concerns No       Patient-reported         6/9/2025    11:09 AM   Last PHQ-9   1.  Little interest or pleasure in doing things 2   2.  Feeling down, depressed, or hopeless 2   3.  Trouble falling or staying asleep, or sleeping too much 2   4.  Feeling tired or having little energy 2   5.  Poor appetite or overeating 2   6.  Feeling bad about yourself 2   7.  Trouble concentrating 2   8.  Moving slowly or restless 2   Q9: Thoughts of better off dead/self-harm past 2 weeks 1   PHQ-9 Total Score 17    In the past two weeks have you had thoughts of suicide or self harm? No   Do you have concerns about your personal safety or the safety of others? No       Patient-reported               6/9/2025    11:39 AM   C-SSRS (Brief [place])   Within the last month, have you wished you were dead or wished you could go to sleep and not wake up? Yes   Within the last month, have you had any actual thoughts of killing yourself? No   Within the last month, have you ever done anything, started to do anything, or prepared to do anything to end your life? No               Follow Up Actions Taken  Crisis resource information provided in the After Visit Summary  Patient declined referral.    Discussed the following  ways the patient can remain in a safe environment:  meditation and habit adjustment    Follow-up   Follow-up in 1 week if symptoms are not improving        Subjective   Sky is a 25 year old, presenting for the following health issues:  Trauma (Stepped on pedro pablo nail - left foot. 1 hour ago.)        6/9/2025    11:16 AM   Additional Questions   Roomed by Vika CHANEY RN   Accompanied by self         6/9/2025    11:16 AM   Patient Reported Additional Medications   Patient reports taking the following new medications none     Trauma    History of Present Illness       Reason for visit:  Pedro Pablo screw in foot  Symptom onset:  Today  Symptoms include:  Mild foot pain  Symptom intensity:  Mild  Symptom progression:  Staying the same  Had these symptoms before:  Yes  Has tried/received treatment for these symptoms:  No  What makes it worse:  Walking  What makes it better:  Not walking He is missing 1 dose(s) of medications per week.  He is not taking prescribed medications regularly due to remembering to take.          Sky is a 25-year-old male with a past medical history significant for multiple sclerosis who presents today to seek medical attention after stepping on a pedro pablo screw.  Patient was working on doing some unloading from a trailer in flip-flops, and a screw went into the plantar aspect of his left foot just below his great toe.  He did not have to manually remove the screw, and it left a very small puncture wound that bled for less than 2 minutes, and then bleeding has resolved.  He otherwise declines any acute streaking or spreading redness, edema, purulent discharge, or persistent severe pain.  He continues to have good strength and sensation in the extremity, and declines any concerns for fever, chills, body aches, headaches, neurological changes, nausea, or vomiting.  Patient does believe that he is up-to-date on childhood vaccinations, but does not know the last time he has had a tetanus booster.    Patient's  "PHQ-9 score today is a 17, and he indicates that there are several days over the last 2 weeks where he has had thoughts of self-harm, but declines any intention, plan, or history of self-harm or suicide.  He is not currently taking any medication to manage mental health, and has never taken any historically.  He has tried talk therapy in the past, but did not feel it was helpful.  He notes that he is currently self-medicating with marijuana for his mental health, which is helpful in the short-term.  He identifies that lifestyle adjustments and environment would likely be the best way to manage his mental health moving forward, but is able to attest to the fact that this is something that is difficult for him.  He is doing his best to work towards this via meditation, and habit changes.        3/14/2025     3:04 PM 6/9/2025    11:09 AM   PHQ   PHQ-9 Total Score 20  17    Q9: Thoughts of better off dead/self-harm past 2 weeks More than half the days  Several days   F/U: Thoughts of suicide or self-harm Yes  No   F/U: Self harm-plan No     F/U: Self-harm action No     F/U: Safety concerns No  No       Patient-reported    Proxy-reported          Review of Systems  Constitutional, HEENT, cardiovascular, pulmonary, gi and gu systems are negative, except as otherwise noted.      Objective    Pulse 66   Temp 97.8  F (36.6  C) (Temporal)   Resp 11   Ht 1.778 m (5' 10\")   Wt 91.7 kg (202 lb 1.6 oz)   SpO2 97%   BMI 29.00 kg/m    Body mass index is 29 kg/m .  Physical Exam   GENERAL: alert and no distress  NECK: no adenopathy, no asymmetry, masses, or scars  RESP: lungs clear to auscultation - no rales, rhonchi or wheezes  CV: regular rate and rhythm, normal S1 S2, no S3 or S4, no murmur, click or rub, no peripheral edema  ABDOMEN: soft, nontender, no hepatosplenomegaly, no masses and bowel sounds normal  MS: no gross musculoskeletal defects noted, no edema  SKIN: Small 2 mm puncture wound at plantar aspect of left foot " just below great toe.  Small amount of surrounding erythema spanning approximately 3 mm in diameter without active discharge, edema, or open wound.  NEURO: Normal strength and tone, mentation intact and speech normal    Gaby Banks DNP FNP-C  Family Nurse Practitioner - Same Day Provider  [facility]        Signed Electronically by: MARÍA Gan CNP

## 2025-06-09 NOTE — PATIENT INSTRUCTIONS
I am prescribing an antibiotic to you called Keflex.  You will take this medication twice daily for 10 days. It is very important that you take the entire course of the medication regardless of symptom improvement.  This medication can sometimes cause stomach upset, so taking it with food can help to prevent this.  Common and normal side effects of antibiotic use include mild stomach upset and some loose stool.  Side effects that are not normal would be excruciating abdominal pain, full body rash, or persistent vomiting.  If you experience any of the symptoms, please stop taking the medication and let me know right away.     I would recommend cleaning the area with warm water and gentle soap twice daily, and gently patting the area dry.  Avoid anything abrasive surrounding the area when cleansing including heavily scented soaps, abrasive/exfoliative products, or aggressive scrubbing in the area.    You can then apply Aquaphor or Vaseline to the area to keep it moist while healing, and keep it covered with a bandage to avoid allowing any additional debris or bacteria to enter    Please avoid picking, touching, or scratching at the area, as this leads to risk for worsened infection.    Please follow-up in 1 week if symptoms are worsening or not improving    Please seek immediate medical attention with symptoms including persistent or worsening swelling in, colorful drainage from the area, streaking or spreading redness surrounding the area, persistent uncontrolled bleeding, loss of strength or sensation in the area, fever, chills, body aches, nausea, vomiting, severe headache, confusion, chest pain, or shortness of breath

## 2025-06-09 NOTE — NURSING NOTE
Prior to immunization administration, verified patients identity using patient s name and date of birth. Please see Immunization Activity for additional information.     Screening Questionnaire for Adult Immunization    Are you sick today?   No   Do you have allergies to medications, food, a vaccine component or latex?   No   Have you ever had a serious reaction after receiving a vaccination?   No   Do you have a long-term health problem with heart, lung, kidney, or metabolic disease (e.g., diabetes), asthma, a blood disorder, no spleen, complement component deficiency, a cochlear implant, or a spinal fluid leak?  Are you on long-term aspirin therapy?   No   Do you have cancer, leukemia, HIV/AIDS, or any other immune system problem? MS   Yes   Do you have a parent, brother, or sister with an immune system problem?   No   In the past 3 months, have you taken medications that affect  your immune system, such as prednisone, other steroids, or anticancer drugs; drugs for the treatment of rheumatoid arthritis, Crohn s disease, or psoriasis; or have you had radiation treatments?   No   Have you had a seizure, or a brain or other nervous system problem?   No   During the past year, have you received a transfusion of blood or blood    products, or been given immune (gamma) globulin or antiviral drug?   No   For women: Are you pregnant or is there a chance you could become       pregnant during the next month?   No   Have you received any vaccinations in the past 4 weeks?   No     Immunization questionnaire answers were all negative.      Pt tolerated injections. Site was cleansed with alcohol prior to injections. No pain, burning, swelling or redness at the site of the injection. Patient instructed to remain in clinic for 15 minutes afterwards, and to report any adverse reactions     Screening performed by Millicent Abrams RN on 6/9/2025 at 11:48 AM.

## 2025-06-09 NOTE — NURSING NOTE
TATY FAXED:  [facility]  [address]     Phone: [phone]  Fax: [phone]

## 2025-06-10 ENCOUNTER — TELEPHONE (OUTPATIENT)
Dept: FAMILY MEDICINE | Facility: CLINIC | Age: 25
End: 2025-06-10
Payer: COMMERCIAL

## 2025-06-10 NOTE — TELEPHONE ENCOUNTER
Celina 10, 2025    Outside records received from [facility].  Records were placed in the inbox for Gaby Banks DNP to review.  A copy was sent to HIM to be scanned into the patient's chart.    Raquel Judd